# Patient Record
Sex: MALE | Race: WHITE
[De-identification: names, ages, dates, MRNs, and addresses within clinical notes are randomized per-mention and may not be internally consistent; named-entity substitution may affect disease eponyms.]

---

## 2018-03-14 ENCOUNTER — HOSPITAL ENCOUNTER (OUTPATIENT)
Dept: HOSPITAL 24 - RAD | Age: 58
Discharge: HOME | End: 2018-03-14
Attending: INTERNAL MEDICINE
Payer: COMMERCIAL

## 2018-03-14 DIAGNOSIS — M51.27: ICD-10-CM

## 2018-03-14 DIAGNOSIS — M25.551: ICD-10-CM

## 2018-03-14 DIAGNOSIS — M25.552: Primary | ICD-10-CM

## 2018-03-14 DIAGNOSIS — M51.26: ICD-10-CM

## 2018-03-14 PROCEDURE — 72148 MRI LUMBAR SPINE W/O DYE: CPT

## 2018-03-14 NOTE — MRI
HISTORY:  57-year-old male with low back pain radiating into both legs.



Study: MRI of the lumbar spine



Comparison: Previous MRI of the lumbar spine from 08/22/2013



Technique: Multiplanar multi-sequence MRI of the lumbar spine was obtained.  Sagittal T1, sagittal T2
, and stir weighted images, axial T1, and axial T2 images were obtained.



Findings:



The lumbar spine demonstrates normal alignment with the expected signal characteristics of the bone m
arrow.  The conus of the cord terminates normally



T12 -- L1: No significant disc bulge or protrusion. Spinal canal and neural foramina are widely paten
t



L1 -- L2: No significant disc bulge or protrusion. Spinal canal and neural foramina are widely patent




L2 -- L3: No significant disc bulge or protrusion. Spinal canal and neural foramina are widely patent
.



L3 -- L4: No significant disc bulge or protrusion. Spinal canal and neural foramina are widely patent
.



L4 -- L5: Mild right-sided disc bulging results in mild encroachment on the right neural foramen. Spi
nal canal and left neural foramen are patent however.



L5 -- S1: Marked narrowing of the L5-S1 disc space is again seen. Broad-base posterior and left-sided
 disc protrusion is again noted resulting in a moderate degree of bilateral neuroforaminal compromise
. Findings are similar to those noted on the previous exam from 08/22/2013.



IMPRESSION:



No significant findings are at the L5-S1 level where a broad-based posterior and left-sided disc prot
rusion results in a moderate degree of bilateral neural foraminal encroachment. These findings are si
milar to those noted on the exam from 08/22/2013.



Reported By:Electronically Signed by MARLENY FELTON MD at 3/14/2018 12:01:22 PM